# Patient Record
Sex: MALE
[De-identification: names, ages, dates, MRNs, and addresses within clinical notes are randomized per-mention and may not be internally consistent; named-entity substitution may affect disease eponyms.]

---

## 2020-09-22 PROBLEM — Z00.00 ENCOUNTER FOR PREVENTIVE HEALTH EXAMINATION: Status: ACTIVE | Noted: 2020-09-22

## 2020-10-02 ENCOUNTER — APPOINTMENT (OUTPATIENT)
Dept: ORTHOPEDIC SURGERY | Facility: CLINIC | Age: 30
End: 2020-10-02
Payer: COMMERCIAL

## 2020-10-02 VITALS
SYSTOLIC BLOOD PRESSURE: 120 MMHG | WEIGHT: 194 LBS | DIASTOLIC BLOOD PRESSURE: 80 MMHG | OXYGEN SATURATION: 99 % | TEMPERATURE: 98.6 F | BODY MASS INDEX: 26.28 KG/M2 | HEART RATE: 76 BPM | HEIGHT: 72 IN

## 2020-10-02 DIAGNOSIS — Z78.9 OTHER SPECIFIED HEALTH STATUS: ICD-10-CM

## 2020-10-02 DIAGNOSIS — F17.200 NICOTINE DEPENDENCE, UNSPECIFIED, UNCOMPLICATED: ICD-10-CM

## 2020-10-02 DIAGNOSIS — Z80.0 FAMILY HISTORY OF MALIGNANT NEOPLASM OF DIGESTIVE ORGANS: ICD-10-CM

## 2020-10-02 DIAGNOSIS — C96.6 UNIFOCAL LANGERHANS-CELL HISTIOCYTOSIS: ICD-10-CM

## 2020-10-02 PROCEDURE — 99244 OFF/OP CNSLTJ NEW/EST MOD 40: CPT

## 2020-10-02 NOTE — DATA REVIEWED
[Imaging Present] : Present [de-identified] : X-rays from the outside 3 views of the shoulder show a small lucency in the area of the scapular neck behind the glenoid superiorly.\par \par MRI from September 21, 2020 shows an aggressive mass approximately 3 x 2 and half centimeters in the scapular neck eroding anterior and posterior cortex and goes up into the subscapular fossa.  There is also some surrounding edematous changes.  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

## 2020-10-02 NOTE — CONSULT LETTER
[FreeTextEntry2] : Juan Oconnor MD\par , known universe\par [address] [facility] [facility] [address]\par [place] [id number] [FreeTextEntry1] : Esteemed Professor,\par \par After evaluating your patient and reviewing the studies presented we have come to a mutually agreeable plan. \par \par Please see my note below.\par \par Should you have further questions, please feel free to call and discuss the care of your patient.\par \par Thank you for the confidence of your referral.\par \par Sincerely, \par \par Jasmeet Navarro MD \par Musculoskeletal Oncology \par [address] [facility], [address], \[place] [address] \par [id number]\par [phone]

## 2020-10-02 NOTE — DISCUSSION/SUMMARY
[All Questions Answered] : Patient (and family) had all questions answered to an agreeable level of satisfaction [Interested in Proceeding] : Patient (and family) expressed understanding and interest in proceeding with the plan as outlined [de-identified] : Patient has findings consistent with Langerhans' cell histiocytosis.  I want to complete his work-up with a PET scan to see if this is solitary or if there are other lesions.  If this is a solitary lesion we can discuss an intralesional curettage and steroid injection to help promote healing.  If this is a lesion with multiple foci around his body which would be evident on a PET/CT that I would refer him for systemic therapy.  I work with a very good oncologist who has a special interest in Langerhans' cell histiocytosis.  I will review this with the patient after his PET/CT and plan for further treatment after.\par \par If imaging was ordered, the patient was told to make an appointment to review findings right after all imaging is completed.\par \par We discussed risks, benefits and alternatives. Rationale of care was reviewed and all questions were answered. Patient (and family) had all questions answered to her degree of the level of satisfaction. Patient (and family) expressed understanding and interest in proceeding with the plan as outlined.\par \par \par \par \par This note was done with a voice recognition transcription software and any typos are related to this rather than medical error. Surgical risks reviewed. Patient (and family) had all questions answered to an agreeable level of satisfaction. Patient (and family) expressed understanding and interest in proceeding with the plan as outlined.  \par

## 2020-10-02 NOTE — REVIEW OF SYSTEMS
[Chills] : no chills [Feeling Tired] : not feeling tired [Fever] : no fever [Joint Pain] : joint pain [Easy Bruising] : does not bruise easily

## 2020-10-02 NOTE — HISTORY OF PRESENT ILLNESS
[FreeTextEntry1] : This is a 30-year-old gentleman has been complaining of approximately 6 months of shoulder pain.  For a while at the beginning he had significant night pain and was unable to move.  Following this he has had a dull aching where he is difficulty lifting up his shoulder.  Passively he can be moved however he has pain with anything over 100 degrees.  He was worked up with an x-ray and then an MRI finding a lesion in the scapular neck.  He had a biopsy showing Langerhans' cell histiocytosis and then was sent to me for further evaluation.  He has occasional times of being extra tired but has no other constitutional symptoms.\par \par He was worked up for headaches a few years ago had a CT and was told that he had a "cyst" in his nose. [Stable] : stable [___ mths] : [unfilled] month(s) ago [3] : currently ~his/her~ pain is 3 out of 10 [Direct Pressure] : worsened by direct pressure [Joint Movement] : worsened by joint movement [Lifting] : worsened by lifting [None] : No relieving factors are noted

## 2020-10-02 NOTE — PHYSICAL EXAM
[FreeTextEntry1] : On exam the patient stands in good balance.  He is right-hand dominant.  His left shoulder has some general aches surrounding it.  He is able to lifted it to 100 degrees and passively can come up to 150 degrees.  He has some pain with letdown.  He has no biceps tenderness.  He is stable with rotation.  He has a healing incision from his biopsy posteriorly.  There is no erythema or other problems.  He has no axillary or lymphadenopathy in his supraclavicular infraclavicular or neck. [General Appearance - Well-Appearing] : Well appearing [General Appearance - Well Nourished] : well nourished [Oriented To Time, Place, And Person] : Oriented to person, place, and time [Impaired Insight] : Insight and judgment were intact [Affect] : The affect was normal. [Mood] : the mood was normal [Sclera] : the sclera and conjunctiva were normal [Neck Cervical Mass (___cm)] : no neck mass was observed [Heart Rate And Rhythm] : heart rate was normal and rhythm regular [] : No respiratory distress [Abdomen Soft] : Soft [Normal Station and Gait] : gait and station were normal [Tenderness] : no tenderness [Swelling] : no swelling [Skin Changes - Describe changes:] : No skin changes noted [Full UE ROM unless otherwise noted:] : Full range of motion unless otherwise noted. [UE Motor Strength Normal unless otherwise noted:] : 5/5 strength in bilateral upper extremities unless otherwise noted. [Normal] : Sensation intact to light touch. [2+] : left 2+

## 2020-10-05 LAB
ALBUMIN SERPL ELPH-MCNC: 5.3 G/DL
ALP BLD-CCNC: 89 U/L
ALT SERPL-CCNC: 23 U/L
ANION GAP SERPL CALC-SCNC: 12 MMOL/L
AST SERPL-CCNC: 15 U/L
BASOPHILS # BLD AUTO: 0.03 K/UL
BASOPHILS NFR BLD AUTO: 0.4 %
BILIRUB SERPL-MCNC: 0.5 MG/DL
BUN SERPL-MCNC: 15 MG/DL
CALCIUM SERPL-MCNC: 10.4 MG/DL
CHLORIDE SERPL-SCNC: 100 MMOL/L
CO2 SERPL-SCNC: 28 MMOL/L
CREAT SERPL-MCNC: 0.93 MG/DL
CRP SERPL-MCNC: <0.1 MG/DL
EOSINOPHIL # BLD AUTO: 0.14 K/UL
EOSINOPHIL NFR BLD AUTO: 2 %
ERYTHROCYTE [SEDIMENTATION RATE] IN BLOOD BY WESTERGREN METHOD: 4 MM/HR
GLUCOSE SERPL-MCNC: 88 MG/DL
HCT VFR BLD CALC: 46.5 %
HGB BLD-MCNC: 15.5 G/DL
IMM GRANULOCYTES NFR BLD AUTO: 0.3 %
LDH SERPL-CCNC: 125 U/L
LYMPHOCYTES # BLD AUTO: 1.57 K/UL
LYMPHOCYTES NFR BLD AUTO: 22.8 %
MAN DIFF?: NORMAL
MCHC RBC-ENTMCNC: 29.9 PG
MCHC RBC-ENTMCNC: 33.3 GM/DL
MCV RBC AUTO: 89.6 FL
MONOCYTES # BLD AUTO: 0.66 K/UL
MONOCYTES NFR BLD AUTO: 9.6 %
NEUTROPHILS # BLD AUTO: 4.48 K/UL
NEUTROPHILS NFR BLD AUTO: 64.9 %
PLATELET # BLD AUTO: 232 K/UL
POTASSIUM SERPL-SCNC: 4.4 MMOL/L
PROT SERPL-MCNC: 7.7 G/DL
RBC # BLD: 5.19 M/UL
RBC # FLD: 11.8 %
SODIUM SERPL-SCNC: 141 MMOL/L
WBC # FLD AUTO: 6.9 K/UL